# Patient Record
Sex: MALE | Race: WHITE | NOT HISPANIC OR LATINO | URBAN - METROPOLITAN AREA
[De-identification: names, ages, dates, MRNs, and addresses within clinical notes are randomized per-mention and may not be internally consistent; named-entity substitution may affect disease eponyms.]

---

## 2017-01-23 ENCOUNTER — FOLLOW UP (OUTPATIENT)
Dept: URBAN - METROPOLITAN AREA CLINIC 96 | Facility: CLINIC | Age: 52
End: 2017-01-23

## 2017-01-23 DIAGNOSIS — H43.393: ICD-10-CM

## 2017-01-23 DIAGNOSIS — H35.411: ICD-10-CM

## 2017-01-23 DIAGNOSIS — H35.713: ICD-10-CM

## 2017-01-23 PROCEDURE — 92134 CPTRZ OPH DX IMG PST SGM RTA: CPT

## 2017-01-23 PROCEDURE — 92226 OPHTHALMOSCOPY (SUB): CPT

## 2017-01-23 PROCEDURE — 92014 COMPRE OPH EXAM EST PT 1/>: CPT

## 2017-01-23 ASSESSMENT — VISUAL ACUITY
OS_CC: 20/25-1
OD_CC: 20/30-2
OD_PH: 20/25-3

## 2017-01-23 ASSESSMENT — TONOMETRY
OD_IOP_MMHG: 21
OS_IOP_MMHG: 21

## 2017-02-27 ENCOUNTER — FOLLOW UP (OUTPATIENT)
Dept: URBAN - METROPOLITAN AREA CLINIC 96 | Facility: CLINIC | Age: 52
End: 2017-02-27

## 2017-02-27 DIAGNOSIS — H43.393: ICD-10-CM

## 2017-02-27 DIAGNOSIS — H35.411: ICD-10-CM

## 2017-02-27 DIAGNOSIS — H25.13: ICD-10-CM

## 2017-02-27 DIAGNOSIS — H35.713: ICD-10-CM

## 2017-02-27 PROCEDURE — 92226 OPHTHALMOSCOPY (SUB): CPT

## 2017-02-27 PROCEDURE — 92134 CPTRZ OPH DX IMG PST SGM RTA: CPT

## 2017-02-27 PROCEDURE — 1036F TOBACCO NON-USER: CPT

## 2017-02-27 PROCEDURE — 92014 COMPRE OPH EXAM EST PT 1/>: CPT

## 2017-02-27 PROCEDURE — G8427 DOCREV CUR MEDS BY ELIG CLIN: HCPCS

## 2017-02-27 ASSESSMENT — VISUAL ACUITY
OD_CC: 20/30-1
OS_CC: 20/25

## 2017-02-27 ASSESSMENT — TONOMETRY
OS_IOP_MMHG: 14
OD_IOP_MMHG: 14

## 2017-04-04 ENCOUNTER — FOLLOW UP (OUTPATIENT)
Dept: URBAN - METROPOLITAN AREA CLINIC 96 | Facility: CLINIC | Age: 52
End: 2017-04-04

## 2017-04-04 DIAGNOSIS — H35.713: ICD-10-CM

## 2017-04-04 PROCEDURE — 92014 COMPRE OPH EXAM EST PT 1/>: CPT

## 2017-04-04 PROCEDURE — 92134 CPTRZ OPH DX IMG PST SGM RTA: CPT

## 2017-04-04 PROCEDURE — 1036F TOBACCO NON-USER: CPT

## 2017-04-04 PROCEDURE — 92226 OPHTHALMOSCOPY (SUB): CPT

## 2017-04-04 PROCEDURE — G8427 DOCREV CUR MEDS BY ELIG CLIN: HCPCS

## 2017-04-04 ASSESSMENT — VISUAL ACUITY
OS_CC: 20/30-2
OD_CC: 20/30-3

## 2017-04-04 ASSESSMENT — TONOMETRY
OS_IOP_MMHG: 26
OD_IOP_MMHG: 19
OS_IOP_MMHG: 31

## 2017-06-27 ENCOUNTER — FOLLOW UP (OUTPATIENT)
Dept: URBAN - METROPOLITAN AREA CLINIC 96 | Facility: CLINIC | Age: 52
End: 2017-06-27

## 2017-06-27 DIAGNOSIS — H35.713: ICD-10-CM

## 2017-06-27 DIAGNOSIS — H35.411: ICD-10-CM

## 2017-06-27 DIAGNOSIS — H43.393: ICD-10-CM

## 2017-06-27 PROCEDURE — 92226 OPHTHALMOSCOPY (SUB): CPT

## 2017-06-27 PROCEDURE — 1036F TOBACCO NON-USER: CPT

## 2017-06-27 PROCEDURE — 92014 COMPRE OPH EXAM EST PT 1/>: CPT

## 2017-06-27 PROCEDURE — 92134 CPTRZ OPH DX IMG PST SGM RTA: CPT

## 2017-06-27 PROCEDURE — G8427 DOCREV CUR MEDS BY ELIG CLIN: HCPCS

## 2017-06-27 ASSESSMENT — VISUAL ACUITY
OD_PH: 20/25-2
OS_CC: 20/25
OD_CC: 20/30-2

## 2017-06-27 ASSESSMENT — TONOMETRY
OD_IOP_MMHG: 19
OS_IOP_MMHG: 19

## 2017-10-30 ENCOUNTER — FOLLOW UP (OUTPATIENT)
Dept: URBAN - METROPOLITAN AREA CLINIC 96 | Facility: CLINIC | Age: 52
End: 2017-10-30

## 2017-10-30 DIAGNOSIS — H35.411: ICD-10-CM

## 2017-10-30 DIAGNOSIS — H43.393: ICD-10-CM

## 2017-10-30 DIAGNOSIS — H35.713: ICD-10-CM

## 2017-10-30 PROCEDURE — 4040F PNEUMOC VAC/ADMIN/RCVD: CPT | Mod: 8P

## 2017-10-30 PROCEDURE — G8427 DOCREV CUR MEDS BY ELIG CLIN: HCPCS

## 2017-10-30 PROCEDURE — 1036F TOBACCO NON-USER: CPT

## 2017-10-30 PROCEDURE — 92014 COMPRE OPH EXAM EST PT 1/>: CPT

## 2017-10-30 PROCEDURE — 92134 CPTRZ OPH DX IMG PST SGM RTA: CPT

## 2017-10-30 PROCEDURE — 92226 OPHTHALMOSCOPY (SUB): CPT

## 2017-10-30 ASSESSMENT — TONOMETRY
OS_IOP_MMHG: 19
OD_IOP_MMHG: 20

## 2017-10-30 ASSESSMENT — VISUAL ACUITY
OD_CC: 20/25-3
OS_CC: 20/20

## 2019-11-19 ENCOUNTER — CONSULTATION (OUTPATIENT)
Dept: URBAN - METROPOLITAN AREA CLINIC 96 | Facility: CLINIC | Age: 54
End: 2019-11-19

## 2019-11-19 DIAGNOSIS — H35.713: ICD-10-CM

## 2019-11-19 DIAGNOSIS — H25.13: ICD-10-CM

## 2019-11-19 DIAGNOSIS — H35.411: ICD-10-CM

## 2019-11-19 DIAGNOSIS — H43.393: ICD-10-CM

## 2019-11-19 PROCEDURE — 92134 CPTRZ OPH DX IMG PST SGM RTA: CPT

## 2019-11-19 PROCEDURE — 92226 OPHTHALMOSCOPY (SUB): CPT

## 2019-11-19 PROCEDURE — 92014 COMPRE OPH EXAM EST PT 1/>: CPT

## 2019-11-19 ASSESSMENT — TONOMETRY
OS_IOP_MMHG: 19
OD_IOP_MMHG: 19

## 2019-11-19 ASSESSMENT — VISUAL ACUITY
OS_CC: 20/20
OD_CC: 20/40+1

## 2021-02-02 ENCOUNTER — FOLLOW UP (OUTPATIENT)
Dept: URBAN - METROPOLITAN AREA CLINIC 96 | Facility: CLINIC | Age: 56
End: 2021-02-02

## 2021-02-02 DIAGNOSIS — H35.411: ICD-10-CM

## 2021-02-02 DIAGNOSIS — H43.393: ICD-10-CM

## 2021-02-02 DIAGNOSIS — H35.713: ICD-10-CM

## 2021-02-02 PROCEDURE — 92014 COMPRE OPH EXAM EST PT 1/>: CPT

## 2021-02-02 PROCEDURE — 92202 OPSCPY EXTND ON/MAC DRAW: CPT

## 2021-02-02 PROCEDURE — 92134 CPTRZ OPH DX IMG PST SGM RTA: CPT

## 2021-02-02 ASSESSMENT — VISUAL ACUITY
OS_PH: 20/20-1
OS_CC: 20/30+2
OD_CC: 20/80+2
OD_PH: 20/60

## 2021-02-02 ASSESSMENT — TONOMETRY
OD_IOP_MMHG: 14
OS_IOP_MMHG: 19

## 2021-03-05 ENCOUNTER — FOLLOW UP (OUTPATIENT)
Dept: URBAN - METROPOLITAN AREA CLINIC 51 | Facility: CLINIC | Age: 56
End: 2021-03-05

## 2021-03-05 VITALS — HEIGHT: 67 IN | BODY MASS INDEX: 33.74 KG/M2 | WEIGHT: 215 LBS

## 2021-03-05 DIAGNOSIS — H43.393: ICD-10-CM

## 2021-03-05 DIAGNOSIS — H25.13: ICD-10-CM

## 2021-03-05 DIAGNOSIS — H35.411: ICD-10-CM

## 2021-03-05 DIAGNOSIS — H35.713: ICD-10-CM

## 2021-03-05 PROCEDURE — 92134 CPTRZ OPH DX IMG PST SGM RTA: CPT

## 2021-03-05 PROCEDURE — 92202 OPSCPY EXTND ON/MAC DRAW: CPT

## 2021-03-05 PROCEDURE — 92014 COMPRE OPH EXAM EST PT 1/>: CPT

## 2021-03-05 ASSESSMENT — VISUAL ACUITY
OS_CC: 20/20
OD_CC: 20/60+2

## 2021-03-05 ASSESSMENT — TONOMETRY
OS_IOP_MMHG: 21
OD_IOP_MMHG: 21

## 2022-09-29 ENCOUNTER — FOLLOW UP (OUTPATIENT)
Dept: URBAN - METROPOLITAN AREA CLINIC 96 | Facility: CLINIC | Age: 57
End: 2022-09-29

## 2022-09-29 DIAGNOSIS — H25.13: ICD-10-CM

## 2022-09-29 DIAGNOSIS — H43.393: ICD-10-CM

## 2022-09-29 DIAGNOSIS — H35.713: ICD-10-CM

## 2022-09-29 DIAGNOSIS — H35.411: ICD-10-CM

## 2022-09-29 PROCEDURE — 92134 CPTRZ OPH DX IMG PST SGM RTA: CPT

## 2022-09-29 PROCEDURE — 92202 OPSCPY EXTND ON/MAC DRAW: CPT

## 2022-09-29 PROCEDURE — 92014 COMPRE OPH EXAM EST PT 1/>: CPT

## 2022-09-29 ASSESSMENT — TONOMETRY
OS_IOP_MMHG: 20
OD_IOP_MMHG: 22
OD_IOP_MMHG: 23

## 2022-09-29 ASSESSMENT — VISUAL ACUITY
OS_CC: 20/20-1
OD_CC: 20/60-3

## 2023-09-21 ENCOUNTER — FOLLOW UP (OUTPATIENT)
Dept: URBAN - METROPOLITAN AREA CLINIC 96 | Facility: CLINIC | Age: 58
End: 2023-09-21

## 2023-09-21 DIAGNOSIS — H35.713: ICD-10-CM

## 2023-09-21 DIAGNOSIS — H35.372: ICD-10-CM

## 2023-09-21 PROCEDURE — 92134 CPTRZ OPH DX IMG PST SGM RTA: CPT

## 2023-09-21 PROCEDURE — 92014 COMPRE OPH EXAM EST PT 1/>: CPT

## 2023-09-21 PROCEDURE — 92202 OPSCPY EXTND ON/MAC DRAW: CPT

## 2023-09-21 ASSESSMENT — TONOMETRY
OD_IOP_MMHG: 12
OS_IOP_MMHG: 13

## 2023-09-21 ASSESSMENT — VISUAL ACUITY
OS_CC: 20/20
OD_CC: 20/60+1

## 2025-02-22 ENCOUNTER — APPOINTMENT (EMERGENCY)
Dept: RADIOLOGY | Facility: HOSPITAL | Age: 60
End: 2025-02-22
Payer: COMMERCIAL

## 2025-02-22 ENCOUNTER — HOSPITAL ENCOUNTER (EMERGENCY)
Facility: HOSPITAL | Age: 60
Discharge: HOME | End: 2025-02-22
Attending: EMERGENCY MEDICINE | Admitting: EMERGENCY MEDICINE
Payer: COMMERCIAL

## 2025-02-22 VITALS
HEART RATE: 80 BPM | RESPIRATION RATE: 18 BRPM | WEIGHT: 207.67 LBS | DIASTOLIC BLOOD PRESSURE: 70 MMHG | SYSTOLIC BLOOD PRESSURE: 143 MMHG | OXYGEN SATURATION: 97 % | TEMPERATURE: 98.2 F

## 2025-02-22 DIAGNOSIS — M25.551 RIGHT HIP PAIN: Primary | ICD-10-CM

## 2025-02-22 PROCEDURE — 72170 X-RAY EXAM OF PELVIS: CPT

## 2025-02-22 PROCEDURE — 73552 X-RAY EXAM OF FEMUR 2/>: CPT

## 2025-02-22 PROCEDURE — 99285 EMERGENCY DEPT VISIT HI MDM: CPT | Performed by: EMERGENCY MEDICINE

## 2025-02-22 PROCEDURE — 96372 THER/PROPH/DIAG INJ SC/IM: CPT

## 2025-02-22 PROCEDURE — 99283 EMERGENCY DEPT VISIT LOW MDM: CPT

## 2025-02-22 RX ORDER — METOPROLOL SUCCINATE 25 MG/1
25 TABLET, EXTENDED RELEASE ORAL DAILY
COMMUNITY

## 2025-02-22 RX ORDER — ATORVASTATIN CALCIUM 10 MG/1
10 TABLET, FILM COATED ORAL DAILY
COMMUNITY

## 2025-02-22 RX ORDER — OXYCODONE HYDROCHLORIDE 5 MG/1
5 TABLET ORAL EVERY 4 HOURS PRN
Qty: 16 TABLET | Refills: 0 | Status: SHIPPED | OUTPATIENT
Start: 2025-02-22 | End: 2025-03-04

## 2025-02-22 RX ORDER — KETOROLAC TROMETHAMINE 30 MG/ML
15 INJECTION, SOLUTION INTRAMUSCULAR; INTRAVENOUS ONCE
Status: COMPLETED | OUTPATIENT
Start: 2025-02-22 | End: 2025-02-22

## 2025-02-22 RX ORDER — CYCLOBENZAPRINE HCL 10 MG
10 TABLET ORAL 3 TIMES DAILY PRN
COMMUNITY

## 2025-02-22 RX ORDER — MORPHINE SULFATE 10 MG/ML
8 INJECTION, SOLUTION INTRAMUSCULAR; INTRAVENOUS ONCE
Status: COMPLETED | OUTPATIENT
Start: 2025-02-22 | End: 2025-02-22

## 2025-02-22 RX ORDER — AMLODIPINE BESYLATE 5 MG/1
5 TABLET ORAL DAILY
COMMUNITY

## 2025-02-22 RX ADMIN — KETOROLAC TROMETHAMINE 15 MG: 30 INJECTION, SOLUTION INTRAMUSCULAR; INTRAVENOUS at 21:17

## 2025-02-22 RX ADMIN — MORPHINE SULFATE 8 MG: 10 INJECTION INTRAVENOUS at 22:26

## 2025-02-23 NOTE — ED ATTENDING ATTESTATION
2/22/2025  IEsther MD, saw and evaluated the patient. I have discussed the patient with the resident/non-physician practitioner and agree with the resident's/non-physician practitioner's findings, Plan of Care, and MDM as documented in the resident's/non-physician practitioner's note, except where noted. All available labs and Radiology studies were reviewed.  I was present for key portions of any procedure(s) performed by the resident/non-physician practitioner and I was immediately available to provide assistance.       At this point I agree with the current assessment done in the Emergency Department.  I have conducted an independent evaluation of this patient a history and physical is as follows:    59-year-old male presents to the ER with right hip pain.  Chronic pain.  Seeing orthopedics outpatient.  Gets steroid injections.  No falls or trauma.  No skin changes.  No fevers or chills.  No repetitive motion or running.    Agree with x-rays and pain control.    Reviewed x-rays.  No fractures.  Recommend patient follow with orthopedics.      ED Course         Critical Care Time  Procedures

## 2025-02-23 NOTE — ED PROVIDER NOTES
"Time reflects when diagnosis was documented in both MDM as applicable and the Disposition within this note       Time User Action Codes Description Comment    2/22/2025 11:24 PM Alex Chang Add [M25.551] Right hip pain           ED Disposition       ED Disposition   Discharge    Condition   Stable    Date/Time   Sat Feb 22, 2025 11:24 PM    Comment   Janusz Mosquera discharge to home/self care.                   Assessment & Plan       Medical Decision Making  Differential diagnose includes but not limited to: Fracture, dislocation, contusion, muscle sprain, muscle strain.    Patient came in with severe 10 out of 10 pain which started this morning.  He had similar pain previously and was being treated with seemingly appropriate improvement until this morning.  Difficult to find comfortable position.  Given Toradol initially which only provided minimal and transient relief, given dose of morphine with moderate relief and patient able to ambulate around room.  X-rays negative for any acute bony abnormalities.  Comfortable with discharge and given a an ambulatory referral to orthopedics.  Patient given outpatient pain control while awaiting orthopedic care.  Instructed follow closely with PCP as well.  Patient understands and agrees with the plan.  Strict return precautions given if symptoms are worsening or not resolving.  Patient discharged in stable condition.      Portions of the record have been created with voice recognition software.  Occasional wrong word or \"sound a like\" substitution may have occurred due to the inherent limitations of voice recognition software.  Read the chart carefully and recognize, using context, where substitutions have occurred.       Amount and/or Complexity of Data Reviewed  Radiology: ordered and independent interpretation performed.    Risk  Prescription drug management.        ED Course as of 02/22/25 7140   Sat Feb 22, 2025   2240 On reevaluation, patient still has " significant hip pain.  Will give dose of opioids, patient did not drive here and has a ride home.   2355 After further pain control, patient endorsed moderate relief of pain.  Able to ambulate around the room without difficulty and is comfortable with being discharged at this time.  He states he has orthopedics contact in [place] where he normally goes, but was requesting a referral from us in case he is unable to get in with them.  Patient prescribed outpatient pain medication for further control.       Medications   ketorolac (TORADOL) injection 15 mg (15 mg Intramuscular Given 2/22/25 2117)   morphine injection 8 mg (8 mg Intramuscular Given 2/22/25 2226)       ED Risk Strat Scores                            SBIRT 20yo+      Flowsheet Row Most Recent Value   Initial Alcohol Screen: US AUDIT-C     1. How often do you have a drink containing alcohol? 1 Filed at: 02/22/2025 2032   2. How many drinks containing alcohol do you have on a typical day you are drinking?  0 Filed at: 02/22/2025 2032   3a. Male UNDER 65: How often do you have five or more drinks on one occasion? 0 Filed at: 02/22/2025 2032   Audit-C Score 1 Filed at: 02/22/2025 2032   ERIK: How many times in the past year have you...    Used an illegal drug or used a prescription medication for non-medical reasons? Never Filed at: 02/22/2025 2032                            History of Present Illness       Chief Complaint   Patient presents with    Hip Pain     Pt c/o right hip pain since 2/2/25. Pt has gotten a steroid shot and has seen PT for this and was given a muscle relaxer. Was having relief with those interventions. But complains that its not working anymore as of yesterday. Pt isn't able to sit straight up. Has pain with ambulation but not severe.        Past Medical History:   Diagnosis Date    Hypertension       History reviewed. No pertinent surgical history.   History reviewed. No pertinent family history.   Social History     Tobacco Use     Smoking status: Some Days     Types: Cigarettes, Cigars    Smokeless tobacco: Never   Substance Use Topics    Alcohol use: Not Currently     Comment: occ    Drug use: Not Currently      E-Cigarette/Vaping      E-Cigarette/Vaping Substances    Nicotine No     THC No     CBD No     Flavoring No     Other No     Unknown No       I have reviewed and agree with the history as documented.     59-year-old male presents with acute worsening of right hip pain.  He states this started earlier this month and has been treated by Ortho and PT, with significant improvement over the next few weeks.  He states he woke up this morning with no traumatic event known.  He states he was walking slightly more than during his previous treatment, that is the only other change he noticed.  He states the pain is just below the right greater trochanter, nonradiating, denies numbness or tingling.  Difficulty bearing weight due to pain that he describes as 10 out of 10.  Range of motion slightly limited due to pain.  No other complaints at this time.        Review of Systems   Musculoskeletal:  Positive for arthralgias (Right hip).   All other systems reviewed and are negative.          Objective       ED Triage Vitals [02/22/25 2027]   Temperature Pulse Blood Pressure Respirations SpO2 Patient Position - Orthostatic VS   98.2 °F (36.8 °C) 80 143/70 18 97 % Lying      Temp Source Heart Rate Source BP Location FiO2 (%) Pain Score    Oral Monitor Right arm -- 10 - Worst Possible Pain      Vitals      Date and Time Temp Pulse SpO2 Resp BP Pain Score FACES Pain Rating User   02/22/25 2330 -- -- -- -- -- 2 --    02/22/25 2226 -- -- -- -- -- 9 --    02/22/25 2150 -- -- -- -- -- 9 --    02/22/25 2027 98.2 °F (36.8 °C) 80 97 % 18 143/70 10 - Worst Possible Pain -- JA            Physical Exam  Vitals and nursing note reviewed.   Constitutional:       General: He is in acute distress (Due to pain).      Appearance: He is not ill-appearing.   HENT:       Head: Normocephalic and atraumatic.      Right Ear: External ear normal.      Left Ear: External ear normal.      Nose: Nose normal. No congestion or rhinorrhea.      Mouth/Throat:      Mouth: Mucous membranes are moist.      Pharynx: Oropharynx is clear.   Eyes:      General: No scleral icterus.     Extraocular Movements: Extraocular movements intact.   Cardiovascular:      Rate and Rhythm: Normal rate and regular rhythm.      Pulses: Normal pulses.      Heart sounds: Normal heart sounds.   Pulmonary:      Effort: Pulmonary effort is normal.      Breath sounds: Normal breath sounds.   Abdominal:      Palpations: Abdomen is soft.      Tenderness: There is no abdominal tenderness.   Musculoskeletal:         General: Tenderness (Just distal to her right greater trochanter) present. No swelling, deformity or signs of injury. Normal range of motion.      Cervical back: Normal range of motion.   Skin:     General: Skin is warm and dry.      Findings: No bruising, erythema or lesion.      Comments: No bruising, skin changes, deformities noted.   Neurological:      General: No focal deficit present.      Mental Status: He is alert and oriented to person, place, and time.   Psychiatric:         Mood and Affect: Mood normal.         Behavior: Behavior normal.         Results Reviewed       None            XR femur 2 views RIGHT   ED Interpretation by Alex Chang DO (02/22 2241)   On my independent interpretation, no acute bony abnormalities noted.      XR pelvis ap only 1 or 2 vw   ED Interpretation by Alex Chang DO (02/22 2241)   On my independent interpretation, no acute bony abnormalities noted.          Procedures    ED Medication and Procedure Management   Prior to Admission Medications   Prescriptions Last Dose Informant Patient Reported? Taking?   amLODIPine (NORVASC) 5 mg tablet 2/22/2025  Yes Yes   Sig: Take 5 mg by mouth daily   atorvastatin (LIPITOR) 10 mg tablet 2/22/2025  Yes Yes    Sig: Take 10 mg by mouth daily   cyclobenzaprine (FLEXERIL) 10 mg tablet 2/22/2025  Yes Yes   Sig: Take 10 mg by mouth 3 (three) times a day as needed for muscle spasms For right hip   metoprolol succinate (TOPROL-XL) 25 mg 24 hr tablet 2/22/2025  Yes Yes   Sig: Take 25 mg by mouth daily      Facility-Administered Medications: None     Discharge Medication List as of 2/22/2025 11:44 PM        START taking these medications    Details   oxyCODONE (Roxicodone) 5 immediate release tablet Take 1 tablet (5 mg total) by mouth every 4 (four) hours as needed for moderate pain for up to 10 days Max Daily Amount: 30 mg, Starting Sat 2/22/2025, Until Tue 3/4/2025 at 2359, Normal           CONTINUE these medications which have NOT CHANGED    Details   amLODIPine (NORVASC) 5 mg tablet Take 5 mg by mouth daily, Historical Med      atorvastatin (LIPITOR) 10 mg tablet Take 10 mg by mouth daily, Historical Med      cyclobenzaprine (FLEXERIL) 10 mg tablet Take 10 mg by mouth 3 (three) times a day as needed for muscle spasms For right hip, Historical Med      metoprolol succinate (TOPROL-XL) 25 mg 24 hr tablet Take 25 mg by mouth daily, Historical Med             ED SEPSIS DOCUMENTATION   Time reflects when diagnosis was documented in both MDM as applicable and the Disposition within this note       Time User Action Codes Description Comment    2/22/2025 11:24 PM Alex Chang Add [M25.551] Right hip pain                  Alex Chang,   02/22/25 5826